# Patient Record
Sex: MALE | Race: WHITE | Employment: UNEMPLOYED | URBAN - METROPOLITAN AREA
[De-identification: names, ages, dates, MRNs, and addresses within clinical notes are randomized per-mention and may not be internally consistent; named-entity substitution may affect disease eponyms.]

---

## 2019-09-05 ENCOUNTER — HOSPITAL ENCOUNTER (INPATIENT)
Age: 19
LOS: 4 days | Discharge: HOME | DRG: 753 | End: 2019-09-10
Attending: EMERGENCY MEDICINE | Admitting: PSYCHIATRY & NEUROLOGY
Payer: MEDICAID

## 2019-09-05 ENCOUNTER — APPOINTMENT (OUTPATIENT)
Dept: GENERAL RADIOLOGY | Age: 19
DRG: 753 | End: 2019-09-05
Attending: PHYSICIAN ASSISTANT
Payer: MEDICAID

## 2019-09-05 DIAGNOSIS — R07.9 CHEST PAIN, UNSPECIFIED TYPE: ICD-10-CM

## 2019-09-05 DIAGNOSIS — F19.10 POLYSUBSTANCE ABUSE (HCC): ICD-10-CM

## 2019-09-05 DIAGNOSIS — R45.851 SUICIDAL IDEATIONS: Primary | ICD-10-CM

## 2019-09-05 LAB
ALBUMIN SERPL-MCNC: 4.2 G/DL (ref 3.4–5)
ALBUMIN/GLOB SERPL: 1.3 {RATIO} (ref 0.8–1.7)
ALP SERPL-CCNC: 48 U/L (ref 45–117)
ALT SERPL-CCNC: 19 U/L (ref 16–61)
AMPHET UR QL SCN: NEGATIVE
ANION GAP SERPL CALC-SCNC: 8 MMOL/L (ref 3–18)
APAP SERPL-MCNC: <2 UG/ML (ref 10–30)
APPEARANCE UR: CLEAR
AST SERPL-CCNC: 11 U/L (ref 10–38)
BACTERIA URNS QL MICRO: ABNORMAL /HPF
BARBITURATES UR QL SCN: NEGATIVE
BASOPHILS # BLD: 0 K/UL (ref 0–0.1)
BASOPHILS NFR BLD: 0 % (ref 0–2)
BENZODIAZ UR QL: NEGATIVE
BILIRUB SERPL-MCNC: 0.4 MG/DL (ref 0.2–1)
BILIRUB UR QL: NEGATIVE
BUN SERPL-MCNC: 12 MG/DL (ref 7–18)
BUN/CREAT SERPL: 10 (ref 12–20)
CALCIUM SERPL-MCNC: 8.8 MG/DL (ref 8.5–10.1)
CANNABINOIDS UR QL SCN: POSITIVE
CHLORIDE SERPL-SCNC: 103 MMOL/L (ref 100–111)
CK MB CFR SERPL CALC: NORMAL % (ref 0–4)
CK MB SERPL-MCNC: <1 NG/ML (ref 5–25)
CK SERPL-CCNC: 121 U/L (ref 39–308)
CO2 SERPL-SCNC: 30 MMOL/L (ref 21–32)
COCAINE UR QL SCN: POSITIVE
COLOR UR: ABNORMAL
CREAT SERPL-MCNC: 1.18 MG/DL (ref 0.6–1.3)
DIFFERENTIAL METHOD BLD: ABNORMAL
EOSINOPHIL # BLD: 0.1 K/UL (ref 0–0.4)
EOSINOPHIL NFR BLD: 1 % (ref 0–5)
EPITH CASTS URNS QL MICRO: ABNORMAL /LPF (ref 0–5)
ERYTHROCYTE [DISTWIDTH] IN BLOOD BY AUTOMATED COUNT: 12.3 % (ref 11.6–14.5)
ETHANOL SERPL-MCNC: <3 MG/DL (ref 0–3)
GLOBULIN SER CALC-MCNC: 3.3 G/DL (ref 2–4)
GLUCOSE SERPL-MCNC: 133 MG/DL (ref 74–99)
GLUCOSE UR STRIP.AUTO-MCNC: NEGATIVE MG/DL
HCT VFR BLD AUTO: 44.2 % (ref 36–48)
HDSCOM,HDSCOM: ABNORMAL
HGB BLD-MCNC: 15.7 G/DL (ref 13–16)
HGB UR QL STRIP: NEGATIVE
KETONES UR QL STRIP.AUTO: ABNORMAL MG/DL
LEUKOCYTE ESTERASE UR QL STRIP.AUTO: ABNORMAL
LYMPHOCYTES # BLD: 1.8 K/UL (ref 0.9–3.6)
LYMPHOCYTES NFR BLD: 19 % (ref 21–52)
MAGNESIUM SERPL-MCNC: 2.4 MG/DL (ref 1.6–2.6)
MCH RBC QN AUTO: 31.2 PG (ref 24–34)
MCHC RBC AUTO-ENTMCNC: 35.5 G/DL (ref 31–37)
MCV RBC AUTO: 87.9 FL (ref 74–97)
METHADONE UR QL: NEGATIVE
MONOCYTES # BLD: 0.5 K/UL (ref 0.05–1.2)
MONOCYTES NFR BLD: 5 % (ref 3–10)
MUCOUS THREADS URNS QL MICRO: ABNORMAL /LPF
NEUTS SEG # BLD: 6.8 K/UL (ref 1.8–8)
NEUTS SEG NFR BLD: 75 % (ref 40–73)
NITRITE UR QL STRIP.AUTO: NEGATIVE
OPIATES UR QL: POSITIVE
PCP UR QL: NEGATIVE
PH UR STRIP: 6.5 [PH] (ref 5–8)
PLATELET # BLD AUTO: 135 K/UL (ref 135–420)
PMV BLD AUTO: 10.7 FL (ref 9.2–11.8)
POTASSIUM SERPL-SCNC: 3.4 MMOL/L (ref 3.5–5.5)
PROT SERPL-MCNC: 7.5 G/DL (ref 6.4–8.2)
PROT UR STRIP-MCNC: NEGATIVE MG/DL
RBC # BLD AUTO: 5.03 M/UL (ref 4.7–5.5)
RBC #/AREA URNS HPF: ABNORMAL /HPF (ref 0–5)
SALICYLATES SERPL-MCNC: <1.7 MG/DL (ref 2.8–20)
SODIUM SERPL-SCNC: 141 MMOL/L (ref 136–145)
SP GR UR REFRACTOMETRY: 1.03 (ref 1–1.03)
TROPONIN I SERPL-MCNC: <0.02 NG/ML (ref 0–0.04)
UROBILINOGEN UR QL STRIP.AUTO: 1 EU/DL (ref 0.2–1)
WBC # BLD AUTO: 9.1 K/UL (ref 4.6–13.2)
WBC URNS QL MICRO: ABNORMAL /HPF (ref 0–4)

## 2019-09-05 PROCEDURE — 93005 ELECTROCARDIOGRAM TRACING: CPT

## 2019-09-05 PROCEDURE — 99284 EMERGENCY DEPT VISIT MOD MDM: CPT

## 2019-09-05 PROCEDURE — 74011250636 HC RX REV CODE- 250/636: Performed by: PHYSICIAN ASSISTANT

## 2019-09-05 PROCEDURE — 81001 URINALYSIS AUTO W/SCOPE: CPT

## 2019-09-05 PROCEDURE — 85025 COMPLETE CBC W/AUTO DIFF WBC: CPT

## 2019-09-05 PROCEDURE — 82550 ASSAY OF CK (CPK): CPT

## 2019-09-05 PROCEDURE — 96374 THER/PROPH/DIAG INJ IV PUSH: CPT

## 2019-09-05 PROCEDURE — 71046 X-RAY EXAM CHEST 2 VIEWS: CPT

## 2019-09-05 PROCEDURE — 80307 DRUG TEST PRSMV CHEM ANLYZR: CPT

## 2019-09-05 PROCEDURE — 80053 COMPREHEN METABOLIC PANEL: CPT

## 2019-09-05 PROCEDURE — 83735 ASSAY OF MAGNESIUM: CPT

## 2019-09-05 RX ORDER — NALOXONE HYDROCHLORIDE 0.4 MG/ML
0.4 INJECTION, SOLUTION INTRAMUSCULAR; INTRAVENOUS; SUBCUTANEOUS ONCE
Status: COMPLETED | OUTPATIENT
Start: 2019-09-05 | End: 2019-09-05

## 2019-09-05 RX ADMIN — NALOXONE HYDROCHLORIDE 0.4 MG: 0.4 INJECTION, SOLUTION INTRAMUSCULAR; INTRAVENOUS; SUBCUTANEOUS at 23:20

## 2019-09-05 NOTE — ED TRIAGE NOTES
Mom states \"he's been self medicating with marijuana and cocaine and he's suicidal\" Pt. Very groggy falling asleep during assessment. Admits to using cocaine and marijuana today.

## 2019-09-06 PROBLEM — F31.9 BIPOLAR 1 DISORDER (HCC): Status: ACTIVE | Noted: 2019-09-06

## 2019-09-06 PROBLEM — F14.10 COCAINE ABUSE (HCC): Status: ACTIVE | Noted: 2019-09-06

## 2019-09-06 PROBLEM — F12.10 CANNABIS ABUSE: Status: ACTIVE | Noted: 2019-09-06

## 2019-09-06 LAB
ATRIAL RATE: 112 BPM
CALCULATED P AXIS, ECG09: 68 DEGREES
CALCULATED R AXIS, ECG10: 80 DEGREES
CALCULATED T AXIS, ECG11: 66 DEGREES
DIAGNOSIS, 93000: NORMAL
P-R INTERVAL, ECG05: 130 MS
Q-T INTERVAL, ECG07: 320 MS
QRS DURATION, ECG06: 82 MS
QTC CALCULATION (BEZET), ECG08: 436 MS
TROPONIN I SERPL-MCNC: <0.02 NG/ML (ref 0–0.04)
VENTRICULAR RATE, ECG03: 112 BPM

## 2019-09-06 PROCEDURE — 74011250637 HC RX REV CODE- 250/637: Performed by: PSYCHIATRY & NEUROLOGY

## 2019-09-06 PROCEDURE — 84484 ASSAY OF TROPONIN QUANT: CPT

## 2019-09-06 PROCEDURE — 74011250637 HC RX REV CODE- 250/637: Performed by: PHYSICIAN ASSISTANT

## 2019-09-06 PROCEDURE — 65220000005 HC RM SEMIPRIVATE PSYCH 3 OR 4 BED

## 2019-09-06 RX ORDER — ARIPIPRAZOLE 10 MG/1
10 TABLET ORAL DAILY
Status: DISCONTINUED | OUTPATIENT
Start: 2019-09-06 | End: 2019-09-10 | Stop reason: HOSPADM

## 2019-09-06 RX ORDER — TRAZODONE HYDROCHLORIDE 50 MG/1
50 TABLET ORAL
Status: DISCONTINUED | OUTPATIENT
Start: 2019-09-06 | End: 2019-09-10 | Stop reason: HOSPADM

## 2019-09-06 RX ORDER — POTASSIUM CHLORIDE 20 MEQ/1
20 TABLET, EXTENDED RELEASE ORAL
Status: COMPLETED | OUTPATIENT
Start: 2019-09-06 | End: 2019-09-06

## 2019-09-06 RX ORDER — DIVALPROEX SODIUM 250 MG/1
250 TABLET, DELAYED RELEASE ORAL 2 TIMES DAILY
Status: DISCONTINUED | OUTPATIENT
Start: 2019-09-06 | End: 2019-09-09

## 2019-09-06 RX ORDER — HYDROXYZINE PAMOATE 50 MG/1
50 CAPSULE ORAL
Status: DISCONTINUED | OUTPATIENT
Start: 2019-09-06 | End: 2019-09-10 | Stop reason: HOSPADM

## 2019-09-06 RX ORDER — IBUPROFEN 400 MG/1
400 TABLET ORAL
Status: DISCONTINUED | OUTPATIENT
Start: 2019-09-06 | End: 2019-09-10 | Stop reason: HOSPADM

## 2019-09-06 RX ADMIN — ARIPIPRAZOLE 10 MG: 10 TABLET ORAL at 14:35

## 2019-09-06 RX ADMIN — DIVALPROEX SODIUM 250 MG: 250 TABLET, DELAYED RELEASE ORAL at 20:42

## 2019-09-06 RX ADMIN — POTASSIUM CHLORIDE 20 MEQ: 20 TABLET, EXTENDED RELEASE ORAL at 02:56

## 2019-09-06 NOTE — ROUTINE PROCESS
TRANSFER - OUT REPORT:    [facility]z  being transferred to [facility] for routine progression of care. Report consisted of patients Situation, Background, Assessment and   Recommendations(SBAR). Information from the following report(s) SBAR was reviewed with the receiving nurse. Lines:       Opportunity for questions and clarification was provided.       Patient transported with:   [employer]

## 2019-09-06 NOTE — BSMART NOTE
ART THERAPY GROUP PROGRESS NOTE    Group time: 13:15    The patient did not awaken/get up when called for group despite encouragement from staff.

## 2019-09-06 NOTE — BH NOTES
SIVA Note: The above pt has been cooperative this shift. He has not participated in any groups this shift. He has been easily agitated the entire shift. He verbally contrast for safety and denies any self-harm at this tie. He has been compliant with medication this shift.  He has not experienced any falls,

## 2019-09-06 NOTE — BH NOTES
Patient was escorted on unit by  and hospital staff. Patient affect appear flat and sad. He denies any thoughts to harm himself at this time. Patient also contracted for safety. He states \"life\" as his stressor. Patient was cooperative with admission process and signed a release of patient information form for his mother (located in chart). Patient was offered something to drink and eat. He was oriented to his room and unit. He is monitored every 15 minutes for safety and therapeutic support.

## 2019-09-06 NOTE — BSMART NOTE
Comprehensive Assessment Form Part 1    Section I - Disposition    The patient is admitted to [facility] inpatient by Dr Parth Herbert. The Medical Doctor, Terri CHO is in agreement with Psychiatrist disposition. Section II - Integrated Summary  The information is given by the patient and parent. The Chief Complaint is suicide attempt. Previous Hospitalizations: multiple, last 7/2015    Patient is a 20 y.o. male with PMH ADHD, aggressive outbursts, anxiety, depression, bipolar disorder, and substance abuse who presents with c/o suicide attempt. Patient reports taking 2 caps heroin with cocaine in an attempt to end his life. Patient states,\" I tried to OD to kill myself. \" Patient with a history of polysubstance abuse reports the only drug he uses regularly is THC. Patient reports he took the cocaine and heroin specifically to overdose and has no other recent use. Patient lists stressors as \"life.' Patient states,\"I don't feel like living. \" Patient's mother reports patient has recently expressed HI. Patient reports intermittent HI without thoughts towards anyone specific. Patient denies current HI. Patient last inpatient at [facility] in 2015 on the adolescent unit. Patient reports going to residential treatment facilities. Patient denies inpatient treatment as an adult. The potential for suicide is noted by the following: intent, current attempt, ideation and current substance abuse. The patient's appearance shows no evidence of impairment. The patient's behavior shows poor impulse control. The patient is oriented to time, place, person and situation. Patient alert and able to participate in assessment. The patient's speech is soft. The patient's mood  is depressed and is sad. The range of affect is flat. Feelings of helplessness and hopelessness are voiced by the patient . The patient's thought content  demonstrates no evidence of impairment.   The thought process is circumstantial.  The patient's memory shows no evidence of impairment. The patient's appetite is decreased and shows signs of weight loss. The patient's sleep has evidence of insomnia. The patient shows little insight. The patient's judgement is impaired. Patient is voluntary for inpatient treatment. The patient is felt to be at risk for self harm or harm to others.       Rossy Ghosh

## 2019-09-06 NOTE — BSMART NOTE
Pt. is a 20year old male with history of Bipolar disorder , ADHD, Opoid and Marijuana Abuse. pt was hospitalized after a overdose on heroin. DELFINO and psychiatrist discussed the case. DELFINO Contact:  DELFINO met with pa to discuss this admission. Pt admits he has been having relationship issues . pt admits he has been having issues with controlling his anger, has poor impulse and gets easily distracted. Pt admits to smoking marijuana daily. Pt. admits he intentionally overdosed on heroin after a argument with 28year old girlfriend. Pt. Reflected on his negative behaviors and goals. Pt. states when he gets mad ,it is hard for him to control his anger. Pt states he loves his girlfriend but is afraid he might hurt her verbally or physically. Pt reports he knows something is wrong with him for making random or bizarre statements. Pr. Provided an example : pt. reports he was arguing with his girlfriend, pt states he told the girlfriend he would rape and kill her aunt  If the girlfriend ever cheated on him. Pt. States he would not do anything like that but is not sure why he made such a bizarre statement. SW discussed  How verbal statements could be reflected as a threat  And discussed doemsetci violence. SW had pt to reflect on other incidents of poor self control. SW provided positive feedback: poor impulse  control , negative thoughts and thinking, repressed anger , fear and anxiety. Pt. States he does not want to be this way and came for help. SW discussed relapse prevention, benefits of medication/ therapy and safety plan. Pt. Appears anxious, cooperative, remorseful  and sad. Sw will continue to provide pt with encouragement and assist with dc planning. SW will assist with referring pt to IOP and oupt mental health provider.

## 2019-09-06 NOTE — BSMART NOTE
OCCUPATIONAL THERAPY PROGRESS NOTE  Group Time:  8153  Attendance: The patient attended 3/4 of group. Participation:. The patient participated with minimal elaboration in the activity. Attention:  The patient was able to focus on the activity. Interaction:  The patient acknowledges others or responds to questions,  with no spontaneous interaction. Able to ID some positive self talk he can use. Answers appropriate and thoughtful.

## 2019-09-06 NOTE — ED PROVIDER NOTES
EMERGENCY DEPARTMENT HISTORY AND PHYSICAL EXAM    Date: 9/5/2019  Patient Name: Celestino Kimball    History of Presenting Illness     Chief Complaint   Patient presents with   [address] Problem    Chest Pain         History Provided By: patient     Chief Complaint: suicidal   Duration: weeks  Timing:  acute on chronic  Location: n/a  Quality:n/a  Severity: severe  Modifying Factors: none   Associated Symptoms: cocaine heroine and marijuana abuse       Additional History (Context): Celestino Kimball is a 20 y.o. male with PMH ADHD, aggressive outbursts, anxiety, depression, bipolar disorder, and substance abuse who presents with c/o feeling suicidal for weeks. Pt has a long hx of mental health issues and depression. He admits to using cocaine, 2 caps of heroine, and thc earlier today. Pt is very lethargic during the exam. Pt told the triage nurse he was experiencing chest pain but states he is having some abd pain in the exam room. No other complaints reported at this time. PCP: Edward Kothari MD    Current Outpatient Medications   Medication Sig Dispense Refill    QUEtiapine SR (SEROQUEL XR) 400 mg sr tablet Take 1 Tab by mouth nightly. Indications: BIPOLAR DISORDER 30 Tab 3    Dexmethylphenidate (FOCALIN XR) 30 mg BP50 Take 30 mg by mouth every morning. Indications: ATTENTION-DEFICIT HYPERACTIVITY DISORDER      albuterol (PROVENTIL HFA, VENTOLIN HFA) 90 mcg/actuation inhaler Take 2 Puffs by inhalation every four (4) hours as needed for Wheezing or Shortness of Breath. 1 Inhaler 0    fluticasone (FLONASE) 50 mcg/actuation nasal spray 2 sprays both nostrils daily 1 Bottle 0    miconazole (ANTIFUNGAL CREAM) 2 % topical cream Apply  to affected area two (2) times a day. Topical  Twice daily 15 g 0    ALBUTEROL IN Take 2 Puffs by inhalation as needed (as needed for wheezing or shortness of breath).  fluticasone (FLONASE) 50 mcg/actuation nasal spray 2 Sprays by Both Nostrils route daily.       MICONAZOLE NITRATE (ANTIFUNGAL CREAM EX) by Apply Externally route. Indications: TINEA CAPITIS, TINEA CORPORIS         Past History     Past Medical History:  Past Medical History:   Diagnosis Date    ADHD (attention deficit hyperactivity disorder)     Aggressive outburst     Anxiety disorder     [facility] [address]     Depression     Johana [facility])     Mood disorder ([facility])     Psychiatric disorder     [facility]stance abuse ([facility]     Tinea capitis     Tinea corporis        Past Surgical History:  Past Surgical History:   Procedure Laterality Date    HX HEENT      surgery to tongue at age 10       Family History:  History reviewed. No pertinent family history. Social History:  Social History     Tobacco Use    Smoking status: Current Some Day Smoker     Packs/day: 0.50     Years: 3.00     Pack years: 1.50    Smokeless tobacco: Never Used   Substance Use Topics    Alcohol use: Yes     Comment: \" I rarely drink. \"    Drug use: Yes     Types: Marijuana, Hallucinogenics, Opiates, Cocaine       Allergies: Allergies   Allergen Reactions    Metadate Cd [Methylphenidate] Rash    Risperidone Rash         Review of Systems   Review of Systems   Constitutional: Negative. Negative for chills and fever. HENT: Negative for congestion, ear pain and rhinorrhea. Eyes: Negative. Negative for pain and redness. Respiratory: Negative. Negative for cough, shortness of breath, wheezing and stridor. Cardiovascular: Negative. Negative for chest pain and leg swelling. Gastrointestinal: Negative. Negative for abdominal pain, constipation, diarrhea, nausea and vomiting. Genitourinary: Negative. Negative for dysuria and frequency. Musculoskeletal: Negative. Negative for back pain and neck pain. Skin: Negative. Negative for rash and wound. Neurological: Negative. Negative for dizziness, seizures, syncope and headaches. All other systems reviewed and are negative.     All Other Systems Negative  Physical Exam Vitals:    09/05/19 1958 09/05/19 2230 09/05/19 2315 09/05/19 2345   BP: 128/71 137/66  114/60   Pulse: (!) 126 100 84 91   Resp: 20 17  18   Temp: 98.1 °F (36.7 °C)      SpO2: 99%      Weight: 79.4 kg (175 lb)        Physical Exam   Constitutional: He is oriented to person, place, and time. He appears well-developed and well-nourished. No distress. HENT:   Head: Normocephalic and atraumatic. Eyes: Conjunctivae are normal. Right eye exhibits no discharge. Left eye exhibits no discharge. No scleral icterus. Neck: Normal range of motion. Neck supple. Cardiovascular: Normal rate, regular rhythm and normal heart sounds. Exam reveals no gallop and no friction rub. No murmur heard. Tachycardiac in triage, normal rate on auscultation and while in the exam room    Pulmonary/Chest: Effort normal and breath sounds normal. No stridor. No respiratory distress. He has no wheezes. He has no rales. He exhibits no tenderness. Abdominal: Soft. Bowel sounds are normal. He exhibits no distension. There is tenderness. There is no guarding. Epigastric TTP noted without guarding    Musculoskeletal: Normal range of motion. Neurological: He is oriented to person, place, and time. Coordination normal.   Pt is lethargic during the exam, but is able to be awoken and is oriented x 3. Skin: Skin is warm and dry. No rash noted. He is not diaphoretic. No erythema. Psychiatric:   Lethargic during the exam, consistent with hx of recent heroine use. Nursing note and vitals reviewed.              Diagnostic Study Results     Labs -     Recent Results (from the past 12 hour(s))   EKG, 12 LEAD, INITIAL    Collection Time: 09/05/19  8:09 PM   Result Value Ref Range    Ventricular Rate 112 BPM    Atrial Rate 112 BPM    P-R Interval 130 ms    QRS Duration 82 ms    Q-T Interval 320 ms    QTC Calculation (Bezet) 436 ms    Calculated P Axis 68 degrees    Calculated R Axis 80 degrees    Calculated T Axis 66 degrees    Diagnosis Sinus tachycardia  Otherwise normal ECG  No previous ECGs available     URINALYSIS W/ RFLX MICROSCOPIC    Collection Time: 09/05/19  9:00 PM   Result Value Ref Range    Color DARK YELLOW      Appearance CLEAR      Specific gravity 1.030 1.005 - 1.030      pH (UA) 6.5 5.0 - 8.0      Protein NEGATIVE  NEG mg/dL    Glucose NEGATIVE  NEG mg/dL    Ketone TRACE (A) NEG mg/dL    Bilirubin NEGATIVE  NEG      Blood NEGATIVE  NEG      Urobilinogen 1.0 0.2 - 1.0 EU/dL    Nitrites NEGATIVE  NEG      Leukocyte Esterase SMALL (A) NEG     DRUG SCREEN, URINE    Collection Time: 09/05/19  9:00 PM   Result Value Ref Range    BENZODIAZEPINES NEGATIVE  NEG      BARBITURATES NEGATIVE  NEG      THC (TH-CANNABINOL) POSITIVE (A) NEG      OPIATES POSITIVE (A) NEG      PCP(PHENCYCLIDINE) NEGATIVE  NEG      COCAINE POSITIVE (A) NEG      AMPHETAMINES NEGATIVE  NEG      METHADONE NEGATIVE  NEG      HDSCOM (NOTE)    URINE MICROSCOPIC ONLY    Collection Time: 09/05/19  9:00 PM   Result Value Ref Range    WBC 10 to 20 0 - 4 /hpf    RBC NONE 0 - 5 /hpf    Epithelial cells FEW 0 - 5 /lpf    Bacteria FEW (A) NEG /hpf    Mucus 4+ (A) NEG /lpf   CBC WITH AUTOMATED DIFF    Collection Time: 09/05/19 10:20 PM   Result Value Ref Range    WBC 9.1 4.6 - 13.2 K/uL    RBC 5.03 4.70 - 5.50 M/uL    HGB 15.7 13.0 - 16.0 g/dL    HCT 44.2 36.0 - 48.0 %    MCV 87.9 74.0 - 97.0 FL    MCH 31.2 24.0 - 34.0 PG    MCHC 35.5 31.0 - 37.0 g/dL    RDW 12.3 11.6 - 14.5 %    PLATELET 845 516 - 181 K/uL    MPV 10.7 9.2 - 11.8 FL    NEUTROPHILS 75 (H) 40 - 73 %    LYMPHOCYTES 19 (L) 21 - 52 %    MONOCYTES 5 3 - 10 %    EOSINOPHILS 1 0 - 5 %    BASOPHILS 0 0 - 2 %    ABS. NEUTROPHILS 6.8 1.8 - 8.0 K/UL    ABS. LYMPHOCYTES 1.8 0.9 - 3.6 K/UL    ABS. MONOCYTES 0.5 0.05 - 1.2 K/UL    ABS. EOSINOPHILS 0.1 0.0 - 0.4 K/UL    ABS.  BASOPHILS 0.0 0.0 - 0.1 K/UL    DF AUTOMATED     METABOLIC PANEL, COMPREHENSIVE    Collection Time: 09/05/19 10:20 PM   Result Value Ref Range    Sodium 141 136 - 145 mmol/L    Potassium 3.4 (L) 3.5 - 5.5 mmol/L    Chloride 103 100 - 111 mmol/L    CO2 30 21 - 32 mmol/L    Anion gap 8 3.0 - 18 mmol/L    Glucose 133 (H) 74 - 99 mg/dL    BUN 12 7.0 - 18 MG/DL    Creatinine 1.18 0.6 - 1.3 MG/DL    BUN/Creatinine ratio 10 (L) 12 - 20      GFR est AA >60 >60 ml/min/1.73m2    GFR est non-AA >60 >60 ml/min/1.73m2    Calcium 8.8 8.5 - 10.1 MG/DL    Bilirubin, total 0.4 0.2 - 1.0 MG/DL    ALT (SGPT) 19 16 - 61 U/L    AST (SGOT) 11 10 - 38 U/L    Alk. phosphatase 48 45 - 117 U/L    Protein, total 7.5 6.4 - 8.2 g/dL    Albumin 4.2 3.4 - 5.0 g/dL    Globulin 3.3 2.0 - 4.0 g/dL    A-G Ratio 1.3 0.8 - 1.7     MAGNESIUM    Collection Time: 09/05/19 10:20 PM   Result Value Ref Range    Magnesium 2.4 1.6 - 2.6 mg/dL   CARDIAC PANEL,(CK, CKMB & TROPONIN)    Collection Time: 09/05/19 10:20 PM   Result Value Ref Range     39 - 308 U/L    CK - MB <1.0 <3.6 ng/ml    CK-MB Index  0.0 - 4.0 %     CALCULATION NOT PERFORMED WHEN RESULT IS BELOW LINEAR LIMIT    Troponin-I, QT <0.02 0.0 - 0.045 NG/ML   ETHYL ALCOHOL    Collection Time: 09/05/19 10:20 PM   Result Value Ref Range    ALCOHOL(ETHYL),SERUM <3 0 - 3 MG/DL   SALICYLATE    Collection Time: 09/05/19 10:20 PM   Result Value Ref Range    Salicylate level <3.2 (L) 2.8 - 20.0 MG/DL   ACETAMINOPHEN    Collection Time: 09/05/19 10:20 PM   Result Value Ref Range    Acetaminophen level <2 (L) 10.0 - 30.0 ug/mL   TROPONIN I    Collection Time: 09/06/19 12:06 AM   Result Value Ref Range    Troponin-I, QT <0.02 0.0 - 0.045 NG/ML       Radiologic Studies -   XR CHEST PA LAT    (Results Pending)     CT Results  (Last 48 hours)    None        CXR Results  (Last 48 hours)    None            Medical Decision Making   I am the first provider for this patient. I reviewed the vital signs, available nursing notes, past medical history, past surgical history, family history and social history.     Vital Signs-Reviewed the patient's vital signs. Records Reviewed Krista King PA-C   Procedures:  Procedures    Provider Notes (Medical Decision Making): Impression:  SI, heroine abuse, cocaine abuse, marijuana abuse    IV inserted, 0.4 narcan given. Labs show potassium of 3.4. 20 mEq KCl given PO in the ED. UDS positive for thc, cocaine, and opiates. Pt re-evaluated, still very sleepy but states he can stay awake long enough to speak with Crisis. Spoke with Crisis worker Rossy who agrees to evaluate the pt at bedside. Krista King PA-C 2:46 AM     3:17 AM Pt has been seen and evaluated by Crisis worker Rossy. He is recommended to be admitted to [facility]. Pt is stable for admission at this time. Krista King PA-C     MED RECONCILIATION:  No current facility-administered medications for this encounter. Current Outpatient Medications   Medication Sig    QUEtiapine SR (SEROQUEL XR) 400 mg sr tablet Take 1 Tab by mouth nightly. Indications: BIPOLAR DISORDER    Dexmethylphenidate (FOCALIN XR) 30 mg BP50 Take 30 mg by mouth every morning. Indications: ATTENTION-DEFICIT HYPERACTIVITY DISORDER    albuterol (PROVENTIL HFA, VENTOLIN HFA) 90 mcg/actuation inhaler Take 2 Puffs by inhalation every four (4) hours as needed for Wheezing or Shortness of Breath.  fluticasone (FLONASE) 50 mcg/actuation nasal spray 2 sprays both nostrils daily    miconazole (ANTIFUNGAL CREAM) 2 % topical cream Apply  to affected area two (2) times a day. Topical  Twice daily    ALBUTEROL IN Take 2 Puffs by inhalation as needed (as needed for wheezing or shortness of breath).  fluticasone (FLONASE) 50 mcg/actuation nasal spray 2 Sprays by Both Nostrils route daily.  MICONAZOLE NITRATE (ANTIFUNGAL CREAM EX) by Apply Externally route. Indications: TINEA CAPITIS, TINEA CORPORIS       Disposition:  admitted          Diagnosis     Clinical Impression:   1. Suicidal ideations    2.  Polysubstance abuse (HCC)    3. Chest pain, unspecified type

## 2019-09-06 NOTE — ED NOTES
Patient pulled put IV line and ambulated outside to smoke a cigarette. Advised patient against going out side. Patient continued to walk outside.

## 2019-09-06 NOTE — PROGRESS NOTES
NUTRITION    Nursing Referral: [facility]    RECOMMENDATIONS / PLAN:     - No nutrition intervention indicated at this time. Will re-screen as appropriate. NUTRITION DIAGNOSIS & INTERVENTIONS:     Nutrition Diagnosis: No nutrition diagnosis at this time. ASSESSMENT:     Long hx of mental health issues and depression presented to ED c/o feeling suicidal for weeks. Pt in shower during visit. Nursing reports pt with good appetite and meal intake today at breakfast. Tolerating diet. Average intake adequate to meet patients estimated nutritional needs:   [x] Yes     [] No      [] Unable to determine at this time    Diet: DIET REGULAR    Food Allergies: NKFA  Current Appetite:   [x] Good per nursing    [] Fair     [] Poor     [] Other:  Appetite/meal intake prior to admission:   [] Good     [] Fair     [] Poor     [x] Other: unknown  Feeding Limitations:  [] Swallowing Difficulty       [] Chewing Difficulty       [] Other   Current Meal Intake: No data found. Gastrointestinal Issues:  [] Yes    [x] No: none known   Skin Integrity:  WDL    Pertinent Medications:  Reviewed   Labs:  Reviewed     Anthropometrics:  Ht Readings from Last 1 Encounters:   09/06/19 6' 1\" (1.854 m)       Last 3 Recorded Weights in this Encounter    09/05/19 1958 09/06/19 0530   Weight: 79.4 kg (175 lb) 83.9 kg (185 lb)       Body mass index is 24.41 kg/m². Weight History: Weight gain PTA per chart.   Weight Metrics 9/6/2019 6/28/2015 5/21/2014 12/18/2013 10/26/2013   Weight 185 lb 165 lb 8 oz 188 lb 159 lb 6.4 oz 125 lb   BMI 24.41 kg/m2 22.44 kg/m2 29.44 kg/m2 24.24 kg/m2 -       Admitting Diagnosis: Bipolar 1 disorder (HCC) [F31.9]  Past Medical History:   Diagnosis Date    ADHD (attention deficit hyperactivity disorder)     Aggressive outburst     Anxiety disorder     Bipolar affective ([address]     Depression     Johana ([address]     Mood disorder ([address]     Psychiatric disorder     Substance abuse ([facility])     Tinea capitis     Tinea corporis         Education Needs:        [x] None identified  [] Identified - Not appropriate at this time  []  Identified and addressed - refer to education log  Learning Limitations:   [x] None identified  [] Identified    Cultural, Restorationist & ethnic food preferences identified:  [x] None    [] Yes      ESTIMATED NUTRITION NEEDS:     1239-6079 kcal (MSJx1.2-1.3), 67-84 gm protein (0.8-1 gm/kg), 1 mL/kcal  Based on: 84 kg       [x] Actual BW      [] IBW          MONITORING & EVALUATION:     Nutrition Goal(s):   1. Po intake of meals will meet >75% of patient estimated nutritional needs within the next 7 days. Outcome: [] Met/Ongoing    [] Progressing towards goal    [] Not progressing towards goal    [x] New/Initial goal     Monitor:  [x] Food and beverage intake   [x] Diet order   [x] Nutrition-focused physical findings   [] Weight      Previous Recommendations (for follow-up assessments only):     []   Implemented       []   Not Implemented (RD to address)   [] No Longer Appropriate   [] No Recommendation Made       Discharge Planning: No nutritional discharge needs at this time.    [x]  Participated in care planning, discharge planning, & interdisciplinary rounds as appropriate      Anika Head, RD   Pager: [phone]

## 2019-09-07 PROCEDURE — 65220000005 HC RM SEMIPRIVATE PSYCH 3 OR 4 BED

## 2019-09-07 PROCEDURE — 74011250637 HC RX REV CODE- 250/637: Performed by: PSYCHIATRY & NEUROLOGY

## 2019-09-07 RX ADMIN — ARIPIPRAZOLE 10 MG: 10 TABLET ORAL at 08:18

## 2019-09-07 RX ADMIN — DIVALPROEX SODIUM 250 MG: 250 TABLET, DELAYED RELEASE ORAL at 08:18

## 2019-09-07 RX ADMIN — DIVALPROEX SODIUM 250 MG: 250 TABLET, DELAYED RELEASE ORAL at 20:19

## 2019-09-07 NOTE — BH NOTES
Patient has been up on unit at will. He is alert and orientated to time, place and situation. He has been interacting well with his peers. He denies thoughts of harm to himself or others. Will continue to monitor and provide support.
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
## 2019-09-07 NOTE — PROGRESS NOTES
Behavioral Health Progress Note    Admit Date: 9/5/2019  Hospital day 1    Vitals : No data found. Labs:  No results found for this or any previous visit (from the past 24 hour(s)). Meds:   Current Facility-Administered Medications   Medication Dose Route Frequency    hydrOXYzine pamoate (VISTARIL) capsule 50 mg  50 mg Oral Q4H PRN    ibuprofen (MOTRIN) tablet 400 mg  400 mg Oral Q6H PRN    traZODone (DESYREL) tablet 50 mg  50 mg Oral QHS PRN    ARIPiprazole (ABILIFY) tablet 10 mg  10 mg Oral DAILY    divalproex DR (DEPAKOTE) tablet 250 mg  250 mg Oral BID      Hospital Problems: Principal Problem:    Bipolar 1 disorder ([facility] (9/6/2019)    Active Problems:    Cannabis abuse (9/6/2019)      Cocaine abuse ([facility] (9/6/2019)        Subjective:   Medication side effects: none  none    Mental Status Exam  Sensorium: alert  Orientation: oriented to time, place, person and situation  Relations: cooperative  Eye Contact: appropriate  Appearance: shows no evidence of impairment  Thought Process: normal rate of thoughts and fair abstract reasoning/computation   Thought Content: no evidence of impairment   Suicidal: denies   Homicidal: none   Mood: is euthymic   Affect: stable  Memory: shows no evidence of impairment     Concentration: fair  Abstraction: abstract  Insight: The patient's insight shows no evidence of impairment    OR Fair  Judgement: shows no evidence of impairment OR  Fair    Assessment/Plan:   not changed  Spends most of time in room. Denies mood swings or complaints. Started Abilify without trouble.   Continue close observation,

## 2019-09-07 NOTE — BH NOTES
Pt minimally active in the day area. He spent a majority of time in room, but did come out of room near end of shift. He did not attend group, but has been medication compliant and eating meals. No behavioral issues. Denies current thoughts of SI. Will continue to monitor for safety and support.

## 2019-09-07 NOTE — PROGRESS NOTES
Problem: Suicide  Goal: *STG: Seeks staff when feelings of self harm or harm towards others arise  Outcome: Progressing Towards Goal     Problem: Suicide  Goal: *STG: Attends activities and groups  Outcome: Progressing Towards Goal     Problem: Suicide  Goal: *STG/LTG: Complies with medication therapy  Outcome: Progressing Towards Goal   Patient visible on unit. Sociable and friendly with staff and peers. Verbalizes he feels \"better\" since admission. States he was trying to [place] what life and death was about because you have to be able to experience them both in order to see which is better. \" Family visited and visit appeared to go well. Denies SI/HI/AH at this time. Contracts for safety on unit.

## 2019-09-07 NOTE — BSMART NOTE
Patient appears hygienic and well groomed. Patient demonstrates appropriate behavior during interactions with peers and staff. No c/o of SI/HI, no evidence of AVH observed. Patient participated in activities. Patient was compliant with medications. Patient did not engage in any hazardous activity that may result in a fall or injury. Pt had visitors during this shift.

## 2019-09-07 NOTE — GROUP NOTE
IP  GROUP DOCUMENTATION INDIVIDUAL                                                                          Group Therapy Note    Date: September 7    Group Start Time: 1315  Group End Time: 1330  Group Topic: Nursing    [facility] ADULT CHEM Carmelina Damon RN    [facility] GROUP DOCUMENTATION GROUP    Group Therapy Note    Attendees: 6       Attendance: Did not attend  Luzmaria Delgado RN

## 2019-09-08 PROCEDURE — 74011250637 HC RX REV CODE- 250/637: Performed by: PSYCHIATRY & NEUROLOGY

## 2019-09-08 PROCEDURE — 65220000005 HC RM SEMIPRIVATE PSYCH 3 OR 4 BED

## 2019-09-08 RX ORDER — IBUPROFEN 200 MG
1 TABLET ORAL DAILY
Status: DISCONTINUED | OUTPATIENT
Start: 2019-09-08 | End: 2019-09-10 | Stop reason: HOSPADM

## 2019-09-08 RX ADMIN — ARIPIPRAZOLE 10 MG: 10 TABLET ORAL at 09:05

## 2019-09-08 RX ADMIN — DIVALPROEX SODIUM 250 MG: 250 TABLET, DELAYED RELEASE ORAL at 20:42

## 2019-09-08 RX ADMIN — DIVALPROEX SODIUM 250 MG: 250 TABLET, DELAYED RELEASE ORAL at 09:05

## 2019-09-08 RX ADMIN — TRAZODONE HYDROCHLORIDE 50 MG: 50 TABLET ORAL at 22:28

## 2019-09-08 NOTE — GROUP NOTE
[facility] GROUP DOCUMENTATION INDIVIDUAL                                                                          Group Therapy Note    Date: September 8    Group Start Time: 1000  Group End Time: 1020  Group Topic: Nursing    [facility] ADULT CHEM [address] [facility] [address], RN    IP BH GROUP DOCUMENTATION GROUP    Group Therapy Note    Attendees: 4         Attendance: Did not attend      Dorota Gore RN

## 2019-09-08 NOTE — PROGRESS NOTES
Problem: Suicide  Goal: *STG: Attends activities and groups  Outcome: Progressing Towards Goal  Note:   Patient will attend activities and groups daily  Goal: *STG/LTG: Complies with medication therapy  Outcome: Progressing Towards Goal  Note:   Patient will comply with medication therapy daily     Problem: Depressed Mood (Adult/Pediatric)  Goal: *STG: Remains safe in hospital  Outcome: Progressing Towards Goal  Note:   Patient will remain safe in the hospital daily    Patient has been active and visible in the day area during this shift. Pt has been compliant with medications and consuming meals. He has been less isolative, completed hygiene, and more chatty with peers. PT inquiring about having a Nicoderm patch. Informed him that him being under 21, he may not be able to have the patch. Accepted feedback. Pt somewhat restless. Denies current thoughts of SI and/or self harm. Will continue to monitor for safety and support.

## 2019-09-08 NOTE — PROGRESS NOTES
Behavioral Health Progress Note    Admit Date: 9/5/2019  Hospital day 2    Vitals :   Patient Vitals for the past 8 hrs:   BP Pulse Resp   09/08/19 0926 114/73 68 18     Labs:  No results found for this or any previous visit (from the past 24 hour(s)). Meds:   Current Facility-Administered Medications   Medication Dose Route Frequency    hydrOXYzine pamoate (VISTARIL) capsule 50 mg  50 mg Oral Q4H PRN    ibuprofen (MOTRIN) tablet 400 mg  400 mg Oral Q6H PRN    traZODone (DESYREL) tablet 50 mg  50 mg Oral QHS PRN    ARIPiprazole (ABILIFY) tablet 10 mg  10 mg Oral DAILY    divalproex DR (DEPAKOTE) tablet 250 mg  250 mg Oral BID      Hospital Problems: Principal Problem:    Bipolar 1 disorder ([facility] (9/6/2019)    Active Problems:    Cannabis abuse (9/6/2019)      Cocaine abuse ([facility] (9/6/2019)        Subjective:   Medication side effects: none  drowsy    Mental Status Exam  Sensorium: alert  Orientation: only aware of  time, place and person  Relations: cooperative  Eye Contact: appropriate  Appearance: shows no evidence of impairment  Thought Process: normal rate of thoughts and fair abstract reasoning/computation   Thought Content: no evidence of impairment   Suicidal: denies   Homicidal: none   Mood: is euthymic   Affect: stable  Memory: shows no evidence of impairment     Concentration: fair  Abstraction: concrete  Insight: The patient's insight shows no evidence of impairment    OR Fair  Judgement: shows no evidence of impairment OR  Fair    Assessment/Plan:   not changed  A little drowsy in the am, but not severe. Nurses report that he has been out in day area more. No aggression or agitation.   Continue close observation,   Depakote blood level tomorrow AM, supportive care,

## 2019-09-09 LAB
APPEARANCE UR: CLEAR
BACTERIA URNS QL MICRO: ABNORMAL /HPF
BILIRUB UR QL: NEGATIVE
COLOR UR: YELLOW
EPITH CASTS URNS QL MICRO: ABNORMAL /LPF (ref 0–5)
GLUCOSE UR STRIP.AUTO-MCNC: NEGATIVE MG/DL
HGB UR QL STRIP: NEGATIVE
KETONES UR QL STRIP.AUTO: NEGATIVE MG/DL
LEUKOCYTE ESTERASE UR QL STRIP.AUTO: NEGATIVE
MUCOUS THREADS URNS QL MICRO: ABNORMAL /LPF
NITRITE UR QL STRIP.AUTO: NEGATIVE
PH UR STRIP: 7.5 [PH] (ref 5–8)
PROT UR STRIP-MCNC: NEGATIVE MG/DL
RBC #/AREA URNS HPF: ABNORMAL /HPF (ref 0–5)
SP GR UR REFRACTOMETRY: 1.02 (ref 1–1.03)
UROBILINOGEN UR QL STRIP.AUTO: 1 EU/DL (ref 0.2–1)
VALPROATE SERPL-MCNC: 41 UG/ML (ref 50–100)
WBC URNS QL MICRO: ABNORMAL /HPF (ref 0–4)

## 2019-09-09 PROCEDURE — 81001 URINALYSIS AUTO W/SCOPE: CPT

## 2019-09-09 PROCEDURE — 87491 CHLMYD TRACH DNA AMP PROBE: CPT

## 2019-09-09 PROCEDURE — 74011250637 HC RX REV CODE- 250/637: Performed by: PSYCHIATRY & NEUROLOGY

## 2019-09-09 PROCEDURE — 65220000005 HC RM SEMIPRIVATE PSYCH 3 OR 4 BED

## 2019-09-09 PROCEDURE — 36415 COLL VENOUS BLD VENIPUNCTURE: CPT

## 2019-09-09 PROCEDURE — 80164 ASSAY DIPROPYLACETIC ACD TOT: CPT

## 2019-09-09 RX ORDER — DIVALPROEX SODIUM 250 MG/1
250 TABLET, DELAYED RELEASE ORAL
Status: DISCONTINUED | OUTPATIENT
Start: 2019-09-10 | End: 2019-09-10 | Stop reason: HOSPADM

## 2019-09-09 RX ORDER — DIVALPROEX SODIUM 250 MG/1
500 TABLET, DELAYED RELEASE ORAL
Status: DISCONTINUED | OUTPATIENT
Start: 2019-09-09 | End: 2019-09-10 | Stop reason: HOSPADM

## 2019-09-09 RX ADMIN — ARIPIPRAZOLE 10 MG: 10 TABLET ORAL at 08:30

## 2019-09-09 RX ADMIN — DIVALPROEX SODIUM 250 MG: 250 TABLET, DELAYED RELEASE ORAL at 08:30

## 2019-09-09 RX ADMIN — DIVALPROEX SODIUM 500 MG: 250 TABLET, DELAYED RELEASE ORAL at 20:46

## 2019-09-09 RX ADMIN — TRAZODONE HYDROCHLORIDE 50 MG: 50 TABLET ORAL at 20:46

## 2019-09-09 NOTE — PROGRESS NOTES
Staffing:    No outbursts. Socializes well on the unit,cooperative. Sleeping and eating well. Medical:  -- tolerating meds well. --depaktoe level only 41.  -- labs:ua has wbcs. Pt has no uti symptoms. He reports that he does have genital herpes and he  Is aware that since he acquired that,it is possible he acuqired other STDs as well. No current herpes lesions. No c/o dishcarge    MSE:cm,smiling. \"I feel better. \"No self harm thoughts. He plans to stay with relatives when he leaves. He feels medication is helping. He is more vague about what else will change when he leaves here. A:bipolar,imporving   substance abuse  Abn ua    P:cont meds but increase depakote    Check urine for trich,gc,chlamydia. possible discharge tomorrow if cont to improve.

## 2019-09-09 NOTE — BSMART NOTE
OCCUPATIONAL THERAPY PROGRESS NOTE  Group Time:  2258  Attendance: The patient attended full group. Participation:  The patient participated fully in the activity. Attention:  The patient needed redirection to activity at least once. (talking with peer). Interaction:  The patient frequently interacts with others. Mood brighter than when last seen on 9/6. Has to be encouraged to think out answers at times. Able to ID a change in thinking he wants to work on. Elizabet Milton

## 2019-09-09 NOTE — BH NOTES
MHT NOTE: The pt has been out in the milieu for the majority of the shift conversing and playing games with surrounding pts. He attended dinner, snack and groups. The pt complied with performing his ADL's, abiding by the unit rules and utilizing the non-skid footwear that was provided for his safety. He did not experience any falls. The pt did not voice experiencing SI, HI or A/VH. He will continue to be monitored for behavior, location and safety for the remaining duration of the shift.

## 2019-09-09 NOTE — BH NOTES
GLADYS BRYANT Notes: pt has been in milieu interacting with peers and staff. Pt has not had any compliants with meals, groups or behavior on this shift. Pt has been on phone call with family and friends that appeared to have went well. Pt has not voiced harm to self or others. Staff will continue to monitor pt for safety, precautions and locations.

## 2019-09-09 NOTE — BSMART NOTE
ART THERAPY GROUP PROGRESS NOTE    PATIENT SCHEDULED FOR GROUP AT: [id number]    ATTENDANCE: Full    PARTICIPATION LEVEL: Participates fully in the art process    ATTENTION LEVEL :  Needs occasional re-direction    FOCUS: Grounding    SYMBOLIC & THEMATIC CONTENT AS NOTED IN IMAGERY: He was calm and presented with a cheerful mood. He was easily influenced by negative attention-seeking peer and needed some re-direction.

## 2019-09-10 VITALS
HEIGHT: 73 IN | WEIGHT: 185 LBS | HEART RATE: 79 BPM | BODY MASS INDEX: 24.52 KG/M2 | OXYGEN SATURATION: 100 % | RESPIRATION RATE: 18 BRPM | TEMPERATURE: 99.1 F | SYSTOLIC BLOOD PRESSURE: 126 MMHG | DIASTOLIC BLOOD PRESSURE: 77 MMHG

## 2019-09-10 LAB
C TRACH RRNA SPEC QL NAA+PROBE: NEGATIVE
N GONORRHOEA RRNA SPEC QL NAA+PROBE: NEGATIVE
SPECIMEN SOURCE: NORMAL

## 2019-09-10 PROCEDURE — 74011250637 HC RX REV CODE- 250/637: Performed by: PSYCHIATRY & NEUROLOGY

## 2019-09-10 RX ORDER — ARIPIPRAZOLE 10 MG/1
10 TABLET ORAL DAILY
Qty: 30 TAB | Refills: 1 | Status: SHIPPED | OUTPATIENT
Start: 2019-09-11

## 2019-09-10 RX ORDER — DIVALPROEX SODIUM 250 MG/1
250 TABLET, DELAYED RELEASE ORAL
Qty: 90 TAB | Refills: 1 | Status: SHIPPED | OUTPATIENT
Start: 2019-09-11

## 2019-09-10 RX ADMIN — DIVALPROEX SODIUM 250 MG: 250 TABLET, DELAYED RELEASE ORAL at 08:27

## 2019-09-10 RX ADMIN — ARIPIPRAZOLE 10 MG: 10 TABLET ORAL at 08:25

## 2019-09-10 NOTE — BSMART NOTE
ART THERAPY GROUP PROGRESS NOTE    Group time:1315      The patient did not awaken/get up when called for group.

## 2019-09-10 NOTE — BSMART NOTE
Pt. is a 20year old male with history of Bipolar disorder , ADHD, Opoid and Marijuana Abuse. pt was hospitalized after a overdose on heroin.         SW was informed pt. Will be dc today.       DC PLAN:  SW met with pt to discuss dc.  Pt.states he will continue to take medications and follow-up with [facility]. SW encouraged relapse prevention and safety plan. Pt plans to go and stay at his mother's home . Pt. Has medication appointment with Dr. Rock Baldwin on 9/19/19 @ 3:30 and therapy appointment LANDON Edwards  on 9/25/19 @ 12:15 and [facility] ([facility]) [address] [place] [address], [address] [place] [address] Street · [phone]. Pt. Is advised if he can not miss anymore appointments. Pt. Has a PCP appointment With Dr. Ivonne Wakefield on 9/23/19 @ 2:00 @  [place]   [address] [address]. Pt.'s mother will pick him up at [place].

## 2019-09-10 NOTE — BH NOTES
EMMA Note: The above pt has been cooperative and polite and anxious for discharge. He has not been a management problem this shift. He was educated on staying well after discharge.

## 2019-09-10 NOTE — PROGRESS NOTES
Staffing:    Steady mood,no unsafe behaviors. remains free of self harm thoughts. Sleeping and eating well. Medical:    Urine tests pending. VSS:t=99,p=79,in=385/77. No current physical complaints    MSE:cheerful,calm. NO self harm or violent thoughts. Talked about his plan to get a job,change his lifestyle so it is more healthful. Does agree to outpt treatment. Maty Diehl A:Mood stabilied on current meds   h/o substance abuse-he is willing to make some changes to address this but uncelar he is really willling to sto pthe mj    Abnormal ua,asymptomatic,r/o std-pt is aware this ais a possilbity and agrees to oupt f/u with pcp for this. P:cont meds  carola need a depakote level   At outpt visit  therapy as outpt via [facility],to include substance abuse treatmetn  F/u with pcp  .

## 2019-09-10 NOTE — BH NOTES
Discharge instructions reviewed and given to pt along with RXs and all belongings. Follow up intact. Pt escorted to [place] and an Ashlyn Reeve provided by his parents picked him up. Discharged to home as ordered.

## 2019-09-10 NOTE — PROGRESS NOTES
Problem: Suicide  Goal: *STG: Attends activities and groups  Outcome: Progressing Towards Goal  Note:   Patient attends 2-3 group therapy every day while hospitalized. Problem: Suicide  Goal: *STG/LTG: Complies with medication therapy  Outcome: Progressing Towards Goal  Note:   Patient takes medications as prescribed every day while hospitalized. Problem: Depressed Mood (Adult/Pediatric)  Goal: *STG: Participates in treatment plan  Outcome: Progressing Towards Goal  Note:   Patient participates in treatment plan every day while hospitalized. Patient is cooperative, patient takes medications as prescribed. Patient goes to group therapy, patient gets along with his peers. Patient denies thoughts of suicide will continue to monitor.

## 2019-09-10 NOTE — DISCHARGE INSTRUCTIONS
BEHAVIORAL HEALTH NURSING DISCHARGE NOTE      The following personal items collected during your admission are returned to you:   Dental Appliance: Dental Appliances: None  Vision: Visual Aid: None  Hearing Aid:    Jewelry: Jewelry: None  Clothing: Clothing: Footwear, Pants, Shirt, Undergarments  Other Valuables: Other Valuables: None  Valuables sent to safe: Personal Items Sent to Safe: none      PATIENT INSTRUCTIONS:    You are referred to Other. Follow-up with ***     The discharge information has been reviewed with the patient. The patient verbalized understanding.       Patient armband removed and shredded

## 2019-09-11 NOTE — DISCHARGE SUMMARY
[facility]    Name:  Jesus Carmona  MR#:   [id number]  :  2000  ACCOUNT #:  [de-identified]  ADMIT DATE:  2019  DISCHARGE DATE:  09/10/2019    BASIS FOR ADMISSION:  Suicidal ideation. HOSPITAL COURSE:  Attention is invited to the admission note as well as information from his admissions to this facility in , , and . He has a history of mood swings including angry rages, depressive episodes, and hypomania and has had past trials of Seroquel, Focalin,  Abilify. Information was also obtained as a direct contact with his mother who provided other very helpful information. The patient has been off all medications since he turned 17. He also describes drug use with occasional cocaine use and daily marijuana use. When he was first admitted, he was reactive, quickly changing from pessimistic and hopeless to angry and agitated. After reviewing effects and side effects and various medication options, he agreed with a trial of Abilify with Depakote. In the past, Abilify was partly helpful, but even on that, both he and his mother felt that he still had significant mood swings. On Depakote 250 mg twice a day, he had a level of 41, so the Depakote was changed to 250 in the morning and 500 mg at night. He is also on Abilify 10 mg a day. With this combination, his mood stabilized. The suicidal ideation resolved. He became more hopeful. He was able to work on an outpatient treatment plan. He does feel the medication is very helpful and likes to continue on these. He does agree to treatment, but it is unclear how committed he is to changing his drug use. He had no physical complaints, but urinalysis was positive for white blood cells. He had no discharge. Lab tests were sent for neisseria and chlamydia, but this is pending at time of discharge.   He and I discussed this in detail and he realizes that there is a possibility he has a sexually transmitted disease. He understands that he needs to follow up with his primary care doctor, so that this can be adequately addressed. CMP was within normal limits, other than random glucose was elevated. Hemoglobin A1c is pending at the time of discharge. CBC within normal limits. Urine drug screen is positive for opiates, cocaine, and THC. CONDITION ON DISCHARGE:  This is an alert male. Affect is full. There is no tic or tremor. He has a job interview tomorrow and is also making arrangements to start a [employer] school in January. He is now living with relatives and describes this as a positive living situation. He has no self-harm thoughts or violent thoughts. There is no evidence of psychosis, kwasi or depression. DIAGNOSES:  AXIS 1:  Bipolar disorder, type I, moderate, mixed. Cannabis abuse, episodic. Cocaine abuse, episodic. Attention deficit hyperactivity disorder, combined type by history. AXIS II:  None. AXIS III:  Abnormal urinalysis. PLAN:  1. Follow up with his primary care physician for abnormal urinalysis. 2.  Follow up with this writer for medication management. 3.  Follow up with therapist also at [address] [place] [address] for therapy, including individual, family, and substance abuse. MEDICATIONS:  Depakote  mg in the morning and 500 mg at night, Abilify 10 mg a day. PROGNOSIS:  Fair.       Mayte Ortiz MD      [id number]  D:  09/10/2019 11:44  T:  09/10/2019 11:49  JOB #:  [id number]

## 2020-04-15 ENCOUNTER — HOSPITAL ENCOUNTER (EMERGENCY)
Age: 20
Discharge: LEFT BEFORE DISCHARGE | End: 2020-04-15
Attending: EMERGENCY MEDICINE
Payer: MEDICAID

## 2020-04-15 VITALS
TEMPERATURE: 98.4 F | OXYGEN SATURATION: 97 % | BODY MASS INDEX: 26.18 KG/M2 | HEART RATE: 110 BPM | SYSTOLIC BLOOD PRESSURE: 137 MMHG | WEIGHT: 204 LBS | DIASTOLIC BLOOD PRESSURE: 82 MMHG | HEIGHT: 74 IN | RESPIRATION RATE: 16 BRPM

## 2020-04-15 DIAGNOSIS — F30.9 MANIC STATE (HCC): Primary | ICD-10-CM

## 2020-04-15 LAB
ANION GAP SERPL CALC-SCNC: 9 MMOL/L (ref 3–18)
BASOPHILS # BLD: 0 K/UL (ref 0–0.1)
BASOPHILS NFR BLD: 0 % (ref 0–2)
BUN SERPL-MCNC: 12 MG/DL (ref 7–18)
BUN/CREAT SERPL: 9 (ref 12–20)
CALCIUM SERPL-MCNC: 9.1 MG/DL (ref 8.5–10.1)
CHLORIDE SERPL-SCNC: 104 MMOL/L (ref 100–111)
CO2 SERPL-SCNC: 24 MMOL/L (ref 21–32)
CREAT SERPL-MCNC: 1.31 MG/DL (ref 0.6–1.3)
DIFFERENTIAL METHOD BLD: ABNORMAL
EOSINOPHIL # BLD: 0.1 K/UL (ref 0–0.4)
EOSINOPHIL NFR BLD: 1 % (ref 0–5)
ERYTHROCYTE [DISTWIDTH] IN BLOOD BY AUTOMATED COUNT: 12 % (ref 11.6–14.5)
GLUCOSE SERPL-MCNC: 83 MG/DL (ref 74–99)
HCT VFR BLD AUTO: 43.5 % (ref 36–48)
HGB BLD-MCNC: 15.9 G/DL (ref 13–16)
LYMPHOCYTES # BLD: 1.8 K/UL (ref 0.9–3.6)
LYMPHOCYTES NFR BLD: 15 % (ref 21–52)
MCH RBC QN AUTO: 30.5 PG (ref 24–34)
MCHC RBC AUTO-ENTMCNC: 36.6 G/DL (ref 31–37)
MCV RBC AUTO: 83.3 FL (ref 74–97)
MONOCYTES # BLD: 1.2 K/UL (ref 0.05–1.2)
MONOCYTES NFR BLD: 10 % (ref 3–10)
NEUTS SEG # BLD: 8.7 K/UL (ref 1.8–8)
NEUTS SEG NFR BLD: 74 % (ref 40–73)
PLATELET # BLD AUTO: 184 K/UL (ref 135–420)
PLATELET COMMENTS,PCOM: ABNORMAL
PMV BLD AUTO: 11.5 FL (ref 9.2–11.8)
POTASSIUM SERPL-SCNC: 3.4 MMOL/L (ref 3.5–5.5)
RBC # BLD AUTO: 5.22 M/UL (ref 4.7–5.5)
RBC MORPH BLD: ABNORMAL
SODIUM SERPL-SCNC: 137 MMOL/L (ref 136–145)
WBC # BLD AUTO: 11.8 K/UL (ref 4.6–13.2)

## 2020-04-15 PROCEDURE — 99284 EMERGENCY DEPT VISIT MOD MDM: CPT

## 2020-04-15 PROCEDURE — 85025 COMPLETE CBC W/AUTO DIFF WBC: CPT

## 2020-04-15 PROCEDURE — 80048 BASIC METABOLIC PNL TOTAL CA: CPT

## 2020-04-15 NOTE — ED NOTES
Patient's mother Fritz Stephen) is in the [facility] ED Parking lot on cell phone #[phone]

## 2020-04-15 NOTE — ED TRIAGE NOTES
Patient arrived via [employer] with police escort, police stated they are considering a JIMMY. Patient presents with anxiety, hx of drug use, states he uses meth and Lyndal Mckusick, states he has not slept in over a day.
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
## 2022-03-18 PROBLEM — F31.9 BIPOLAR 1 DISORDER (HCC): Status: ACTIVE | Noted: 2019-09-06

## 2022-03-19 PROBLEM — F14.10 COCAINE ABUSE (HCC): Status: ACTIVE | Noted: 2019-09-06

## 2022-03-19 PROBLEM — F12.10 CANNABIS ABUSE: Status: ACTIVE | Noted: 2019-09-06

## 2022-05-18 ENCOUNTER — HOSPITAL ENCOUNTER (EMERGENCY)
Age: 22
Discharge: HOME | End: 2022-05-19
Attending: EMERGENCY MEDICINE
Payer: MEDICAID

## 2022-05-18 ENCOUNTER — APPOINTMENT (OUTPATIENT)
Dept: GENERAL RADIOLOGY | Age: 22
End: 2022-05-18
Attending: PHYSICIAN ASSISTANT
Payer: MEDICAID

## 2022-05-18 DIAGNOSIS — F19.10 POLYSUBSTANCE ABUSE (HCC): Primary | ICD-10-CM

## 2022-05-18 DIAGNOSIS — L03.116 CELLULITIS OF LEFT LOWER EXTREMITY: ICD-10-CM

## 2022-05-18 LAB
ALBUMIN SERPL-MCNC: 4 G/DL (ref 3.4–5)
ALBUMIN/GLOB SERPL: 1.3 {RATIO} (ref 0.8–1.7)
ALP SERPL-CCNC: 39 U/L (ref 45–117)
ALT SERPL-CCNC: 32 U/L (ref 16–61)
ANION GAP SERPL CALC-SCNC: 10 MMOL/L (ref 3–18)
AST SERPL-CCNC: 31 U/L (ref 10–38)
BASOPHILS # BLD: 0 K/UL (ref 0–0.1)
BASOPHILS NFR BLD: 0 % (ref 0–2)
BILIRUB SERPL-MCNC: 0.5 MG/DL (ref 0.2–1)
BUN SERPL-MCNC: 5 MG/DL (ref 7–18)
BUN/CREAT SERPL: 5 (ref 12–20)
CALCIUM SERPL-MCNC: 9.3 MG/DL (ref 8.5–10.1)
CHLORIDE SERPL-SCNC: 105 MMOL/L (ref 100–111)
CO2 SERPL-SCNC: 27 MMOL/L (ref 21–32)
CREAT SERPL-MCNC: 1.01 MG/DL (ref 0.6–1.3)
DIFFERENTIAL METHOD BLD: ABNORMAL
EOSINOPHIL # BLD: 0.1 K/UL (ref 0–0.4)
EOSINOPHIL NFR BLD: 2 % (ref 0–5)
ERYTHROCYTE [DISTWIDTH] IN BLOOD BY AUTOMATED COUNT: 12.2 % (ref 11.6–14.5)
GLOBULIN SER CALC-MCNC: 3.1 G/DL (ref 2–4)
GLUCOSE SERPL-MCNC: 129 MG/DL (ref 74–99)
HCT VFR BLD AUTO: 41.4 % (ref 36–48)
HGB BLD-MCNC: 14.5 G/DL (ref 13–16)
IMM GRANULOCYTES # BLD AUTO: 0 K/UL (ref 0–0.04)
IMM GRANULOCYTES NFR BLD AUTO: 0 % (ref 0–0.5)
LACTATE BLD-SCNC: 1.74 MMOL/L (ref 0.4–2)
LYMPHOCYTES # BLD: 1.5 K/UL (ref 0.9–3.6)
LYMPHOCYTES NFR BLD: 26 % (ref 21–52)
MCH RBC QN AUTO: 29.4 PG (ref 24–34)
MCHC RBC AUTO-ENTMCNC: 35 G/DL (ref 31–37)
MCV RBC AUTO: 84 FL (ref 78–100)
MONOCYTES # BLD: 0.8 K/UL (ref 0.05–1.2)
MONOCYTES NFR BLD: 13 % (ref 3–10)
NEUTS SEG # BLD: 3.4 K/UL (ref 1.8–8)
NEUTS SEG NFR BLD: 58 % (ref 40–73)
NRBC # BLD: 0 K/UL (ref 0–0.01)
NRBC BLD-RTO: 0 PER 100 WBC
PLATELET # BLD AUTO: 182 K/UL (ref 135–420)
PMV BLD AUTO: 10.9 FL (ref 9.2–11.8)
POTASSIUM SERPL-SCNC: 3.7 MMOL/L (ref 3.5–5.5)
PROT SERPL-MCNC: 7.1 G/DL (ref 6.4–8.2)
RBC # BLD AUTO: 4.93 M/UL (ref 4.35–5.65)
SODIUM SERPL-SCNC: 142 MMOL/L (ref 136–145)
WBC # BLD AUTO: 5.9 K/UL (ref 4.6–13.2)

## 2022-05-18 PROCEDURE — 87040 BLOOD CULTURE FOR BACTERIA: CPT

## 2022-05-18 PROCEDURE — 99285 EMERGENCY DEPT VISIT HI MDM: CPT

## 2022-05-18 PROCEDURE — 80053 COMPREHEN METABOLIC PANEL: CPT

## 2022-05-18 PROCEDURE — 83605 ASSAY OF LACTIC ACID: CPT

## 2022-05-18 PROCEDURE — 96365 THER/PROPH/DIAG IV INF INIT: CPT

## 2022-05-18 PROCEDURE — 96375 TX/PRO/DX INJ NEW DRUG ADDON: CPT

## 2022-05-18 PROCEDURE — 85025 COMPLETE CBC W/AUTO DIFF WBC: CPT

## 2022-05-18 PROCEDURE — 71045 X-RAY EXAM CHEST 1 VIEW: CPT

## 2022-05-18 PROCEDURE — 74011250636 HC RX REV CODE- 250/636: Performed by: PHYSICIAN ASSISTANT

## 2022-05-18 PROCEDURE — 73610 X-RAY EXAM OF ANKLE: CPT

## 2022-05-18 PROCEDURE — 74011000250 HC RX REV CODE- 250: Performed by: PHYSICIAN ASSISTANT

## 2022-05-18 RX ORDER — VANCOMYCIN 2 GRAM/500 ML IN 0.9 % SODIUM CHLORIDE INTRAVENOUS
2000 ONCE
Status: COMPLETED | OUTPATIENT
Start: 2022-05-18 | End: 2022-05-19

## 2022-05-18 RX ORDER — SODIUM CHLORIDE 0.9 % (FLUSH) 0.9 %
5-10 SYRINGE (ML) INJECTION AS NEEDED
Status: DISCONTINUED | OUTPATIENT
Start: 2022-05-18 | End: 2022-05-19 | Stop reason: HOSPADM

## 2022-05-18 RX ORDER — VANCOMYCIN HYDROCHLORIDE
1250 EVERY 12 HOURS
Status: DISCONTINUED | OUTPATIENT
Start: 2022-05-19 | End: 2022-05-19 | Stop reason: HOSPADM

## 2022-05-18 RX ADMIN — CEFTRIAXONE 1 G: 1 INJECTION, POWDER, FOR SOLUTION INTRAMUSCULAR; INTRAVENOUS at 23:30

## 2022-05-18 RX ADMIN — SODIUM CHLORIDE 1000 ML: 900 INJECTION, SOLUTION INTRAVENOUS at 23:19

## 2022-05-18 RX ADMIN — VANCOMYCIN HYDROCHLORIDE 2000 MG: 10 INJECTION, POWDER, LYOPHILIZED, FOR SOLUTION INTRAVENOUS at 23:36

## 2022-05-18 RX ADMIN — SODIUM CHLORIDE 751 ML: 900 INJECTION, SOLUTION INTRAVENOUS at 23:49

## 2022-05-19 VITALS
HEART RATE: 74 BPM | WEIGHT: 202.07 LBS | TEMPERATURE: 99 F | BODY MASS INDEX: 25.93 KG/M2 | DIASTOLIC BLOOD PRESSURE: 61 MMHG | SYSTOLIC BLOOD PRESSURE: 118 MMHG | RESPIRATION RATE: 16 BRPM | OXYGEN SATURATION: 97 % | HEIGHT: 74 IN

## 2022-05-19 LAB
AMPHET UR QL SCN: NEGATIVE
ANION GAP SERPL CALC-SCNC: 5 MMOL/L (ref 3–18)
APPEARANCE UR: CLEAR
ATRIAL RATE: 72 BPM
BARBITURATES UR QL SCN: NEGATIVE
BENZODIAZ UR QL: NEGATIVE
BILIRUB UR QL: NEGATIVE
BUN SERPL-MCNC: 4 MG/DL (ref 7–18)
BUN/CREAT SERPL: 5 (ref 12–20)
CALCIUM SERPL-MCNC: 8.1 MG/DL (ref 8.5–10.1)
CALCULATED P AXIS, ECG09: 75 DEGREES
CALCULATED R AXIS, ECG10: 83 DEGREES
CALCULATED T AXIS, ECG11: 78 DEGREES
CANNABINOIDS UR QL SCN: POSITIVE
CHLORIDE SERPL-SCNC: 110 MMOL/L (ref 100–111)
CO2 SERPL-SCNC: 28 MMOL/L (ref 21–32)
COCAINE UR QL SCN: NEGATIVE
COLOR UR: YELLOW
CREAT SERPL-MCNC: 0.77 MG/DL (ref 0.6–1.3)
DIAGNOSIS, 93000: NORMAL
GLUCOSE SERPL-MCNC: 90 MG/DL (ref 74–99)
GLUCOSE UR STRIP.AUTO-MCNC: NEGATIVE MG/DL
HDSCOM,HDSCOM: ABNORMAL
HGB UR QL STRIP: NEGATIVE
KETONES UR QL STRIP.AUTO: NEGATIVE MG/DL
LEUKOCYTE ESTERASE UR QL STRIP.AUTO: NEGATIVE
METHADONE UR QL: NEGATIVE
NITRITE UR QL STRIP.AUTO: NEGATIVE
OPIATES UR QL: NEGATIVE
P-R INTERVAL, ECG05: 144 MS
PCP UR QL: NEGATIVE
PH UR STRIP: 7.5 [PH] (ref 5–8)
POTASSIUM SERPL-SCNC: 3.7 MMOL/L (ref 3.5–5.5)
PROT UR STRIP-MCNC: NEGATIVE MG/DL
Q-T INTERVAL, ECG07: 404 MS
QRS DURATION, ECG06: 82 MS
QTC CALCULATION (BEZET), ECG08: 442 MS
SODIUM SERPL-SCNC: 143 MMOL/L (ref 136–145)
SP GR UR REFRACTOMETRY: 1.01 (ref 1–1.03)
UROBILINOGEN UR QL STRIP.AUTO: 0.2 EU/DL (ref 0.2–1)
VENTRICULAR RATE, ECG03: 72 BPM

## 2022-05-19 PROCEDURE — 74011250636 HC RX REV CODE- 250/636: Performed by: PHYSICIAN ASSISTANT

## 2022-05-19 PROCEDURE — 80307 DRUG TEST PRSMV CHEM ANLYZR: CPT

## 2022-05-19 PROCEDURE — 96366 THER/PROPH/DIAG IV INF ADDON: CPT

## 2022-05-19 PROCEDURE — 94762 N-INVAS EAR/PLS OXIMTRY CONT: CPT

## 2022-05-19 PROCEDURE — 93005 ELECTROCARDIOGRAM TRACING: CPT

## 2022-05-19 PROCEDURE — 81003 URINALYSIS AUTO W/O SCOPE: CPT

## 2022-05-19 PROCEDURE — 80048 BASIC METABOLIC PNL TOTAL CA: CPT

## 2022-05-19 RX ORDER — CEPHALEXIN 500 MG/1
500 CAPSULE ORAL 4 TIMES DAILY
Qty: 28 CAPSULE | Refills: 0 | Status: SHIPPED | OUTPATIENT
Start: 2022-05-19 | End: 2022-05-26

## 2022-05-19 RX ORDER — SULFAMETHOXAZOLE AND TRIMETHOPRIM 800; 160 MG/1; MG/1
1 TABLET ORAL 2 TIMES DAILY
Qty: 14 TABLET | Refills: 0 | Status: SHIPPED | OUTPATIENT
Start: 2022-05-19 | End: 2022-05-26

## 2022-05-19 RX ADMIN — VANCOMYCIN HYDROCHLORIDE 1250 MG: 10 INJECTION, POWDER, LYOPHILIZED, FOR SOLUTION INTRAVENOUS at 09:41

## 2022-05-19 NOTE — ED NOTES
Pt noted to be standing at bedside trying to untangle himself from wires and pull his IV pole with him away from stretcher. Reports he has to urinate urgently. Brought urinal to patient for use. Returned to room after a few moments and patient is back in bed and urinal is full.

## 2022-05-19 NOTE — ED NOTES
Assumed care of patient. Patient is resting quietly with eyes closed. Awaiting bed placement for detox.

## 2022-05-19 NOTE — ED TRIAGE NOTES
Presents from lobby for the following: Wants to be evaluated for overdose earlier today. Overdose on heroin, received narcan x ? from medics but refused ED eval at the time. Typically uses fentanyl and meth, reports this was first time using heroin. Reports last meth use was this morning. Also wants to be evaluated for infection of leg where he has redness, swelling, pain from previous injection site from meth use. Ambulatory to room 9 at this time.

## 2022-05-19 NOTE — ED NOTES
Pt difficult to arouse from sleep for EKG. Proceeded with EKG and pt bolted awake during collection, briefly confused. Reoriented and reassured. Immediately returned to sleep and remained asleep for duration of EKG and morning lab draw from [facility].

## 2022-05-19 NOTE — ED NOTES
Crisis at bedside discussing POC with patient. Patient is alert and agitated. Patient stated, why would I lie about overdosing if I didn't? I need help. \"

## 2022-05-19 NOTE — BSMART NOTE
Crisis Note: Responded to Dr. Allan Lantigua 's [facility] consult for substance abuse. Crisis counselor to assess as soon as possible, pending medical clearance and labs.

## 2022-05-19 NOTE — BSMART NOTE
Crisis note: Clinician attempted to assess pt however pt was lethargic. Will attempt to assess as soon as pt is awake and alert.
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
## 2022-05-19 NOTE — ED NOTES
[facility] req UDS for proper patient eval. Ordered on UA specimen already in the lab and confirmed sufficient specimen present with lab staff at this time. Awaiting results and [facility] with come to see the patient.

## 2022-05-19 NOTE — BSMART NOTE
Crisis Note: Bed Search Update    [facility]: no beds available     [facility]: no beds available     [facility]: faxed successfully for [phone], under review     [place]: pt does not wish to go to     [facility] [address]: no male beds     [facility] [place]: No answer     [facility]:  No answer
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
## 2022-05-19 NOTE — BSMART NOTE
Crisis note: Patient was reassessed. Patient was sleeping but woke up to his name being called. Patient denies SI/HI. Patient denies A/V hallucinations. Patient did not exhibit any psychotic behaviors. Patient complained about discomfort and pain in his leg. Patient stated the reason why he was visiting the ED due to self-reported overdose. Patient UDS was negative except for marijuana. Disposition: Patient does not meet criteria for inpatient hospitalization. Patient was given outpatient referrals for outpatient treatments.  Patient was discussed with on-call psychiatrist and ED Samanta

## 2022-05-19 NOTE — PROGRESS NOTES
[facility] Pharmacokinetic Monitoring Service - Vancomycin     Duarte Shipley is a 20 y.o. male starting on vancomycin therapy for Sepsis. Pharmacy consulted by MARQUIS Huang for monitoring and adjustment. Target Concentration: Goal AUC/HUGH 400-600 mg*hr/L    Additional Antimicrobials: Ceftriaxone     Pertinent Laboratory Values: Wt Readings from Last 1 Encounters:   05/18/22 91.7 kg (202 lb 1.1 oz)     Temp Readings from Last 1 Encounters:   05/18/22 99 °F (37.2 °C)     No components found for: PROCAL  Estimated Creatinine Clearance: 132.3 mL/min (based on SCr of 1.01 mg/dL). Recent Labs     05/18/22  2253   WBC 5.9     Plan:  1. Dosing recommendations based on Bayesian software  2. Loading dose of Vancomycin 2000 mg x 1 followed by Vancomycin 1250 mg q12h  a. Anticipated AUC of 471 and trough concentration of 14.1 at steady state  3. BMP for tomorrow AM   4. No level ordered at this time   5. Pharmacy will continue to monitor patient and adjust therapy as indicated    Thank you for the consultKareen.  HUMBLE Nuno  5/18/2022 11:43 PM

## 2022-05-19 NOTE — ED NOTES
11:53 AM call from Irene crisis worker, has discussed with psychiatry no indication for admission to the psychiatric unit suitable for discharge note negative drug screen, not really candidate for detox. I have included referrals to [facility] and [facility] [employer] [facility]. Irene has spoken to the patient. He will be discharged. Stable condition.

## 2022-05-19 NOTE — ED PROVIDER NOTES
EMERGENCY DEPARTMENT HISTORY AND PHYSICAL EXAM      Date: (Not on file)  Patient Name: Franklin Khanna    History of Presenting Illness     No chief complaint on file. History Provided By: Patient    HPI: Franklin Khanna, 20 y.o. male PMHx significant for ADHD, bipolar ds, anxiety, depression presents ambulatory to the ED. Patient reports around  he used IV heroin for the first time and required 4 doses of Narcan after overdose. Patient states medics arrived and he declined transport to ED. Pt reports he is afraid due to his worsening drug use and is requesting detox. Patient also reports swelling and redness to his left lower leg. Patient reports previously injecting fentanyl at this area. Denies fevers and chills. Patient has not taken anything for symptoms. Denies pain. Unsure of fevers. Denies CP, SOB, dizziness. There are no other complaints, changes, or physical findings at this time. PCP: Richard Crowley NP    No current facility-administered medications on file prior to encounter. Current Outpatient Medications on File Prior to Encounter   Medication Sig Dispense Refill    ARIPiprazole (ABILIFY) 10 mg tablet Take 1 Tab by mouth daily. Indications: Bipolar I Disorder with Most Recent Episode Mixed 30 Tab 1    divalproex DR (DEPAKOTE) 250 mg tablet Take 1 Tab by mouth daily (after breakfast).  And take 2 Tab  At bedtime  Indications: Bipolar I Disorder with Most Recent Episode Mixed 90 Tab 1       Past History     Past Medical History:  Past Medical History:   Diagnosis Date    ADHD (attention deficit hyperactivity disorder)     Aggressive outburst     Anxiety disorder     Bipolar affective ([facility] [address]     Depression     Johana (HCC)     Mood disorder ([facility]     Psychiatric disorder     Substance abuse ([facility] [address]     Tinea capitis     Tinea corporis        Past Surgical History:  Past Surgical History:   Procedure Laterality Date    HX HEENT      surgery to tongue at age 10       Family History:  No family history on file. Social History:  Social History     Tobacco Use    Smoking status: Current Some Day Smoker     Packs/day: 0.50     Years: 3.00     Pack years: 1.50    Smokeless tobacco: Never Used   Substance Use Topics    Alcohol use: Yes     Comment: \" I rarely drink. \"    Drug use: Yes     Types: Marijuana, Hallucinogenics, Opiates, Cocaine       Allergies: Allergies   Allergen Reactions    Metadate Cd [Methylphenidate] Rash    Risperidone Rash         Review of Systems   Review of Systems   Constitutional: Negative for chills and fever. HENT: Negative for facial swelling. Eyes: Negative for photophobia and visual disturbance. Respiratory: Negative for shortness of breath. Cardiovascular: Negative for chest pain. Gastrointestinal: Negative for abdominal pain, nausea and vomiting. Genitourinary: Negative for flank pain. Skin: Negative for color change, pallor, rash and wound. Neurological: Negative for dizziness, weakness, light-headedness and headaches. All other systems reviewed and are negative. Physical Exam   Physical Exam  Vitals and nursing note reviewed. Constitutional:       General: He is not in acute distress. Appearance: He is well-developed. Comments: Pt in NAD   HENT:      Head: Normocephalic and atraumatic. Eyes:      Conjunctiva/sclera: Conjunctivae normal.   Cardiovascular:      Rate and Rhythm: Regular rhythm. Tachycardia present. Heart sounds: Normal heart sounds. Pulmonary:      Effort: Pulmonary effort is normal. No respiratory distress. Breath sounds: Normal breath sounds. Abdominal:      General: Bowel sounds are normal. There is no distension. Palpations: Abdomen is soft. Musculoskeletal:         General: Normal range of motion. Skin:     General: Skin is warm. Findings: No rash. Comments: Left leg: Localized swelling and erythema to left lower leg. Overlying ankle.  Full AROM intact to ankle with no pain with movement. No induration, fluctuance or drainage. Neurological:      Mental Status: He is alert and oriented to person, place, and time. Psychiatric:         Behavior: Behavior normal.      Comments: Pressured speech         Diagnostic Study Results     Labs -   No results found for this or any previous visit (from the past 12 hour(s)). Radiologic Studies -   No orders to display     CT Results  (Last 48 hours)    None        CXR Results  (Last 48 hours)    None          Medical Decision Making   I am the first provider for this patient. I reviewed the vital signs, available nursing notes, past medical history, past surgical history, family history and social history. Vital Signs-Reviewed the patient's vital signs. Patient Vitals for the past 12 hrs:   Temp Pulse Resp BP SpO2   05/18/22 2240 99 °F (37.2 °C) (!) 115 22 103/85 97 %       Records Reviewed: Nursing Notes, Old Medical Records, Previous Radiology Studies and Previous Laboratory Studies    Provider Notes (Medical Decision Making):   DDx: Polysubstance abuse, Cellulitis, Septic joint    21 yo M who presents with recent heroin overdose and methamphetamine use. Pt requesting detox due to recent overdose. Pt also concerned for possible infection to lower leg. On exam, localized swelling and erythema to lower leg with no signs of abscess or septic joint. No leukocytosis or left shift. ED Course:   Initial assessment performed. The patients presenting problems have been discussed, and they are in agreement with the care plan formulated and outlined with them. I have encouraged them to ask questions as they arise throughout their visit. Dr Clement Macario, attending, evaluated pt at bedside. He reports swelling and erythema to leg consistent with cellulitis with little concern for septic joint. He recommends outpatient treatment Keflex and Bactrim.     0010: Spoke with [facility] regarding pt's polysubstance abuse and request for detox. Crisis states they will come evaluate pt. 0200: Transfer to care to Dr Kj Diaz, attending, at shift change. Plan: Pt medically cleared and awaiting crisis evaluation. Attestations:    MARQUIS Matson    Please note that this dictation was completed with [employer], the computer voice recognition software. Quite often unanticipated grammatical, syntax, homophones, and other interpretive errors are inadvertently transcribed by the computer software. Please disregard these errors. Please excuse any errors that have escaped final proofreading. Thank you.

## 2022-05-25 LAB
BACTERIA SPEC CULT: NORMAL
BACTERIA SPEC CULT: NORMAL
SERVICE CMNT-IMP: NORMAL
SERVICE CMNT-IMP: NORMAL

## 2023-04-07 ENCOUNTER — HOSPITAL ENCOUNTER (EMERGENCY)
Facility: HOSPITAL | Age: 23
Discharge: HOME | End: 2023-04-07

## 2023-04-07 VITALS
WEIGHT: 168 LBS | DIASTOLIC BLOOD PRESSURE: 97 MMHG | HEART RATE: 118 BPM | OXYGEN SATURATION: 99 % | TEMPERATURE: 98.6 F | RESPIRATION RATE: 18 BRPM | SYSTOLIC BLOOD PRESSURE: 163 MMHG | HEIGHT: 74 IN | BODY MASS INDEX: 21.56 KG/M2

## 2023-04-07 ASSESSMENT — PAIN - FUNCTIONAL ASSESSMENT: PAIN_FUNCTIONAL_ASSESSMENT: NONE - DENIES PAIN

## 2023-04-07 NOTE — ED TRIAGE NOTES
Pt w/CC of \"Oral gonorrhea\" x1 week. Pt stated he did a mouth swab/bloodwork at a clinic and came back positive for it. [facility]. No behavioral complaints at this time.

## 2024-01-06 NOTE — PROGRESS NOTES
Problem: Suicide  Goal: *STG: Seeks staff when feelings of self harm or harm towards others arise  Note:   Patient will talk to staff daily about thoughts of self harm. Goal: *STG: Attends activities and groups  Note:   Patient will attend at least 3 groups daily. Problem: Depressed Mood (Adult/Pediatric)  Goal: *STG: Participates in treatment plan  Note:   Patient will offer suggestion to improve treatments. Goal: *STG: Remains safe in hospital  Note:   Patient will remain free from harm during hospital admission. Ambulatory

## 2024-07-24 ENCOUNTER — HOSPITAL ENCOUNTER (EMERGENCY)
Facility: HOSPITAL | Age: 24
Discharge: LEFT BEFORE DISCHARGE | End: 2024-07-25
Attending: EMERGENCY MEDICINE

## 2024-07-24 PROCEDURE — 4500000002 HC ER NO CHARGE

## 2024-07-24 PROCEDURE — 99282 EMERGENCY DEPT VISIT SF MDM: CPT

## 2024-07-24 ASSESSMENT — PAIN SCALES - GENERAL: PAINLEVEL_OUTOF10: 6

## 2024-07-24 ASSESSMENT — PAIN - FUNCTIONAL ASSESSMENT: PAIN_FUNCTIONAL_ASSESSMENT: 0-10

## 2024-07-25 VITALS
HEART RATE: 91 BPM | DIASTOLIC BLOOD PRESSURE: 83 MMHG | OXYGEN SATURATION: 95 % | SYSTOLIC BLOOD PRESSURE: 149 MMHG | RESPIRATION RATE: 20 BRPM | TEMPERATURE: 99.2 F
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
## 2024-08-13 ASSESSMENT — ENCOUNTER SYMPTOMS
CONSTIPATION: 0
VOMITING: 0
NAUSEA: 0
ABDOMINAL DISTENTION: 0
ABDOMINAL PAIN: 0
RESPIRATORY NEGATIVE: 1
DIARRHEA: 0